# Patient Record
Sex: MALE | Race: BLACK OR AFRICAN AMERICAN | Employment: FULL TIME | URBAN - METROPOLITAN AREA
[De-identification: names, ages, dates, MRNs, and addresses within clinical notes are randomized per-mention and may not be internally consistent; named-entity substitution may affect disease eponyms.]

---

## 2021-12-13 ENCOUNTER — OFFICE VISIT (OUTPATIENT)
Dept: ORTHOPEDIC SURGERY | Age: 51
End: 2021-12-13
Payer: COMMERCIAL

## 2021-12-13 VITALS — TEMPERATURE: 97.5 F | WEIGHT: 229.4 LBS | BODY MASS INDEX: 31.99 KG/M2 | OXYGEN SATURATION: 98 % | HEART RATE: 70 BPM

## 2021-12-13 DIAGNOSIS — M20.021 CENTRAL SLIP EXTENSOR TENDON INJURY (BOUTONNIERE), RIGHT: Primary | ICD-10-CM

## 2021-12-13 PROCEDURE — 99203 OFFICE O/P NEW LOW 30 MIN: CPT | Performed by: ORTHOPAEDIC SURGERY

## 2021-12-13 PROCEDURE — 73140 X-RAY EXAM OF FINGER(S): CPT | Performed by: ORTHOPAEDIC SURGERY

## 2021-12-13 NOTE — PROGRESS NOTES
Karla Soto is a 46 y.o. male right handed . Worker's Compensation and legal considerations: none filed. Vitals:    12/13/21 0858   Pulse: 70   Temp: 97.5 °F (36.4 °C)   TempSrc: Temporal   SpO2: 98%   Weight: 229 lb 6.4 oz (104.1 kg)   PainSc:   0 - No pain           Chief Complaint   Patient presents with    Hand Pain     figner lac, middle finger, right          HPI: Patient presents today with a history of a right middle finger laceration 1 month prior. His finger is bent. Date of onset: November 2021    Injury: Yes: Comment: Laceration    Prior Treatment:  Yes: Comment: Urgent care antibiotics    Numbness/ Tingling: No      ROS: Review of Systems - General ROS: negative  Psychological ROS: negative  ENT ROS: negative  Allergy and Immunology ROS: negative  Hematological and Lymphatic ROS: negative  Respiratory ROS: no cough, shortness of breath, or wheezing  Cardiovascular ROS: no chest pain or dyspnea on exertion  Gastrointestinal ROS: no abdominal pain, change in bowel habits, or black or bloody stools  Musculoskeletal ROS: negative  Neurological ROS: negative  Dermatological ROS: negative    Past Medical History:   Diagnosis Date    History of gonorrhea     treated    HSV-1 infection     Hypertension     Prepatellar bursitis of right knee 2014       History reviewed. No pertinent surgical history. Current Outpatient Medications   Medication Sig Dispense Refill    hydroCHLOROthiazide (HYDRODIURIL) 25 mg tablet TAKE 1 TABLET BY MOUTH EVERY DAY         No Known Allergies        PE:     Physical Exam  Vitals and nursing note reviewed. Constitutional:       General: He is not in acute distress. Appearance: Normal appearance. He is not ill-appearing. Cardiovascular:      Pulses: Normal pulses. Pulmonary:      Effort: Pulmonary effort is normal. No respiratory distress. Musculoskeletal:         General: Swelling, tenderness, deformity and signs of injury present.       Cervical back: Normal range of motion and neck supple. Right lower leg: No edema. Left lower leg: No edema. Skin:     General: Skin is warm and dry. Capillary Refill: Capillary refill takes less than 2 seconds. Findings: Erythema and lesion present. No bruising. Neurological:      General: No focal deficit present. Mental Status: He is alert and oriented to person, place, and time. Psychiatric:         Mood and Affect: Mood normal.         Behavior: Behavior normal.            Right middle finger: There is resting boutonniere deformity noted with hyperextension of the DIP joint and flexion of the PIP joint that is passively correctable to within 10 degrees of terminal extension. This is not actively correctable. Neurovascularly intact distally. There is granulation tissue over the dorsal PIP joint of the right middle finger. Imagin2021 3 views of right middle finger does not show any fracture dislocation but finger is resting in a boutonniere deformity. ICD-10-CM ICD-9-CM    1. Central slip extensor tendon injury (boutonniere), right  M20.021 736.21 AMB POC XRAY, FINGER(S), 2+ VIEWS      REFERRAL TO OCCUPATIONAL THERAPY         Plan:     As patient needs something low-profile to be able to wear gloves and continue to work cutting meat, we will send him to occupational therapy for possibly a figure-of-eight splint. Additionally have instructed him to tape his fingers in a figure-of-eight fashion to keep his PIP joint straight. Follow-up and Dispositions    · Return in about 2 months (around 2022) for Reevaluation and OT follow-up.           Plan was reviewed with patient, who verbalized agreement and understanding of the plan

## 2021-12-16 ENCOUNTER — HOSPITAL ENCOUNTER (OUTPATIENT)
Dept: PHYSICAL THERAPY | Age: 51
Discharge: HOME | End: 2021-12-16
Attending: ORTHOPAEDIC SURGERY
Payer: OTHER GOVERNMENT

## 2021-12-16 PROCEDURE — 97760 ORTHOTIC MGMT&TRAING 1ST ENC: CPT

## 2021-12-16 PROCEDURE — 97535 SELF CARE MNGMENT TRAINING: CPT

## 2021-12-16 PROCEDURE — L3933 FO W/O JOINTS CF: HCPCS

## 2021-12-16 NOTE — PROGRESS NOTES
Hand Therapy Evaluation and Daily Note    Patient Name: Elida Proffer  Date:2021  : 1970  Age: 46 y.o.y/o  [x]  Patient  Verified  Payor: Praneeth Italo / Plan: Donny Deleon / Product Type: Commerical /    Referring Provider: DO Noah Rowley MD Visit:  2022  Onset Date:  2021  Surgical Date: na  Surgical Procedure: n/a    In time:11:20 AM  Out time:12:04 PM  Total Treatment Time (min): 44  Total Timed Codes (min): 10  1:1 Treatment Time (MC only): 44   Visit #: 1 of 6    Treatment Area: Finger pain, right [M79.644]    Precautions:    Hand Dominance: left handed   Hand Involved: right    Total Evaluation Time:  10    History of Present Condition:  Patient is a left hand dominant 46 y.o. male with a chief complaint  of right middle finger pain secondary to laceration of Novemeber 2021 when he cut his right middle finger on an edge blade when he was doing some repairs at home. Pt did not initially seek medical treatment until pain and infection took place in the finger. He completed a course of antibiotics and wound continues to heal.      Pain Rating:   Current: (0-no pain 10-debilitating pain) mild   At best: (0-no pain 10-debilitating pain) mild  At worst: (0-no pain 10-debilitating pain) moderate  Location: right finger  Type:  mild   Better with: hot and ice pack  Worse with: banging it    Medications/Allergies/Past Medical History:  See chart; reviewed with patient. HTN    Diagnostic Tests: Imagin/13/2021 3 views of right middle finger does not show any fracture dislocation but finger is resting in a boutonniere deformity.     Prior Level of Function: Independent with ADL/IADL tasks without functional limitations and pain using right hand    Current Level of Function:  Independent with functional limitations    Social History: pt is     Occupation/Job Requirements:     Observation: boutonierre deformity  Scar/incision:   na  Location:  Right middle finger Palpation:  Tenderness with palpation to PIP jt    Range of Motion:   Single Digit ROM CHART as measured in degrees  Digit  A/P 12/16/2021  Right long finger  extension Date  Side    MP      PIP 75     DIP      PINO        Strength:  NT      Sensation:    intact      Edema: Right long finger - P1 7.2 cm, PIP jt 8.0 cm, P2 6.9 cm   Left long finger PIP jt 6.8 cm    Special Tests: n/a    ADLs  Feeding:        []MaxA   []ModA   []Melony   [] CGA   []SBA   []Fanny   [x]Independent  UE Dressing:       []MaxA   []ModA   []Melony   [] CGA   []SBA   []Fanny   [x]Independent  LE Dressing:       []MaxA   []ModA   []Melony   [] CGA   []SBA   []Fanny   [x]Independent  Grooming:       []MaxA   []ModA   []Melony   [] CGA   []SBA   []Fanny   [x]Independent  Toileting:       []MaxA   []ModA   []Melony   [] CGA   []SBA   []Fanny   [x]Independent  Bathing:       []MaxA   []ModA   []Melony   [] CGA   []SBA   []Fanny   [x]Independent  Light Meal Prep:    []MaxA   []ModA   []Melony   [] CGA   []SBA   []Fanny   [x]Independent  Household/Other: []MaxA   []ModA   []Melony   [] CGA   []SBA   []Fanny   [x]Independent  Adaptive Equip:     []MaxA   []ModA   []Melony   [] CGA   []SBA   []Fanny   []Independent  Driving:       []MaxA   []ModA   []Melony   [] CGA   []SBA   []Fanny   [x]Independent      Todays Treatment:  Patient received an initial evaluation today followed by education as to diagnosis, precautions and treatment plan. Patient was provided with a basic home exercise program including DIP flexion PROM. Pt received custom fabricated circumferential orthosis to be worn daily on the right middle finger to keep the PIP extended. Pt was provided with written orthotic instructions and educated to wear continuously. He was able to properly melodie/doff the orthotic during this treatment session. He was also educated on wound care as wound is still healing.       OBJECTIVE  5 min Therapeutic Exercise:  [x] See flow sheet :   Rationale: increase ROM to improve the patients ability to passively flex DIP jt of right long finger    20 min Orthotic/Splinting: circumferential orthotic   Rationale: custom orthotic  to improve the patients ability to reduce PIP jt deformity. 9 min Self Care/Home Management: prognosis, diagnosis, precautions, treatment plan, splint wear and care, wound care   Rationale: education  to improve the patients ability to promote healing of injury site. With   [] TE   [] TA   [] neuro   [] other: Patient Education: [x] Review HEP    [] Progressed/Changed HEP based on:   [] positioning   [] body mechanics   [] transfers   [] heat/ice application   [] Splint wear/care   [] Sensory re-education   [] scar management      [] other:      Pain Level (0-10 scale) post treatment: 1/10    Patient will continue to benefit from skilled OT services to modify and progress therapeutic interventions, address ROM deficits, address strength deficits, analyze and address soft tissue restrictions, instruct in home and community integration and circumferential splinting to attain goals. Assessment: Pt presents to skilled OT today rating his pain level 1/10 in the right hand long finger. There is a boutonierre deformity of the digit with a wound healing on the dorsum PIP jt. There is mild tenderness with palpation to the PIP jt and edema noted in the finger. There is a 75 deg extension lag at the PIP jt.       Evaluation Complexity: History LOW Complexity : Brief history review  Examination LOW Complexity : 1-3 performance deficits relating to physical, cognitive , or psychosocial skils that result in activity limitations and / or participation restrictions  Clinical Decision Making LOW Complexity : No comorbidities that affect functional and no verbal or physical assistance needed to complete eval tasks   Overall Complexity Rating: LOW     Patient would benefit from OT/Hand therapy services for the following problems:  Problem List: Pain effecting function, Decreased range of motion, Decreased strength, Edema effecting function, Decreased coordination/prehension, Decreased ADL/functional abilities , Decreased activity tolerance, Decreased flexibility/joint mobility, Decreased transfer abilities and Sensability     Treatment Plan may include any combination of the following: Therapeutic exercise, Therapeutic activities, Neuromuscular re-education, Physical agent/modality, Scar management, Manual therapy, Splinting/orthoses, Wound care, Patient education and ADLs/IADLs    Patient / Family readiness to learn indicated by: asking questions, trying to perform skills and interest    Persons(s) to be included in education:   patient (P)    Barriers to Learning/Limitations: None    Patient Goal (s): to hopefully get a splint I can wear to work.     Patient Self Reported Health Status: good    Rehabilitation Potential: good    Short Term Goals: To be accomplished in 2  weeks:  Goal:* Patient will be compliant with initial home exercise program to take an active role in their rehabilitation process. Status at Eval: Patient was provided with a basic home exercise program including DIP flexion PROM    Goal:* Patient will demonstrate a good understanding of their condition and strategies for self-management. Status at Eval: pt educated on prognosis, diagnosis, precautions, treatment plan, splint wear and care, wound care    Goal: *Patient/Caregiver instructed in orthotic wear, care, and precautions   Status at [facility]: GOAL MET ON 12/16/2021     Goal: * Written orthotic instructions given. Status at [facility]: GOAL MET ON 12/16/2021     Goal: * Patient demonstrated independent donning and doffing of orthotic(s)   Status at [facility]: GOAL MET ON 12/16/2021     Long Term Goals: To be accomplished in 6 weeks:   Goal:* Pt will demonstrate improved digit extension to 10 deg or less in order to begin using right hand to reach into back pocket.   Status at [facility]: 75 deg extension lag right long finger    Goal:* Pt will improve FOTO outcome measure score by 10 points in order to demonstrate improved functional performance. Status at eval: 72    Goal:* Pt will be compliant with circumferential orthotic wear in order to take an active role in the rehabilitation process.    Status at eval: pt provided custom orthotic for right long finger    Frequency / Duration: Patient to be seen 1 times per week for 6 weeks:    Patient/ Caregiver education and instruction: Diagnosis, prognosis, self care, activity modification, brace/ splint application and exercises    Leopold Grimes, OT,  12/16/2021 11:22 AM

## 2021-12-16 NOTE — PROGRESS NOTES
[facility]  [address] [address]  [place], [address]  [phone] fax    Plan of Care/Statement of Necessity for Occupational Therapy Services    Patient name: Jarrell Smith Start of Care: 2021   Referral source: Richy Vanessa DO : 1970    Medical Diagnosis: Finger pain, right [F45.153]  Payor: Lola Williamson / Plan: Guerrero Polio / Product Type: Commerical /  Onset Date:2021    Treatment Diagnosis: right hand pain   Prior Hospitalization: see medical history Provider#: [id number]   Medications: Verified on Patient summary List    Comorbidities: HTN   Prior Level of Function:  Independent with ADL/IADL tasks without functional limitations and pain using right hand        The Plan of Care and following information is based on the information from the initial evaluation. Assessment/ key information: Patient is a left hand dominant 46 y.o. male with a chief complaint  of right middle finger pain secondary to laceration of Novemeber 2021 when he cut his right middle finger on an edge blade when he was doing some repairs at home. Pt did not initially seek medical treatment until pain and infection took place in the finger. He completed a course of antibiotics and wound continues to heal.   Pt presents to skilled OT today rating his pain level 1/10 in the right hand long finger. There is a boutonierre deformity of the digit with a wound healing on the dorsum PIP jt. There is mild tenderness with palpation to the PIP jt and edema noted in the finger. There is a 75 deg extension lag at the PIP jt. Patient received an initial evaluation today followed by education as to diagnosis, precautions and treatment plan. Patient was provided with a basic home exercise program including DIP flexion PROM. Pt received custom fabricated circumferential orthosis to be worn daily on the right middle finger to keep the PIP extended.  Pt was provided with written orthotic instructions and educated to wear continuously. He was able to properly melodie/doff the orthotic during this treatment session. He was also educated on wound care as wound is still healing. Skilled OT services are necessary to address aforementioned deficits and improve quality of life. Evaluation Complexity: History LOW Complexity : Brief history review  Examination LOW Complexity : 1-3 performance deficits relating to physical, cognitive , or psychosocial skils that result in activity limitations and / or participation restrictions  Clinical Decision Making LOW Complexity : No comorbidities that affect functional and no verbal or physical assistance needed to complete eval tasks   Overall Complexity Rating: LOW     Patient would benefit from OT/Hand therapy services for the following problems:  Problem List: Pain effecting function, Decreased range of motion, Decreased strength, Edema effecting function, Decreased coordination/prehension, Decreased ADL/functional abilities , Decreased activity tolerance, Decreased flexibility/joint mobility, Decreased transfer abilities and Sensability     Treatment Plan may include any combination of the following: Therapeutic exercise, Therapeutic activities, Neuromuscular re-education, Physical agent/modality, Scar management, Manual therapy, Splinting/orthoses, Wound care, Patient education and ADLs/IADLs    Patient / Family readiness to learn indicated by: asking questions, trying to perform skills and interest    Persons(s) to be included in education:   patient (P)    Barriers to Learning/Limitations: None    Patient Goal (s): to hopefully get a splint I can wear to work.     Patient Self Reported Health Status: good    Rehabilitation Potential: good    Short Term Goals: To be accomplished in 2  weeks:  Goal:* Patient will be compliant with initial home exercise program to take an active role in their rehabilitation process.   Status at Eval: Patient was provided with a basic home exercise program including DIP flexion PROM    Goal:* Patient will demonstrate a good understanding of their condition and strategies for self-management. Status at Eval: pt educated on prognosis, diagnosis, precautions, treatment plan, splint wear and care, wound care    Goal: *Patient/Caregiver instructed in orthotic wear, care, and precautions   Status at eval: GOAL MET ON 12/16/2021     Goal: * Written orthotic instructions given. Status at eval: GOAL MET ON 12/16/2021     Goal: * Patient demonstrated independent donning and doffing of orthotic(s)   Status at eval: GOAL MET ON 12/16/2021     Long Term Goals: To be accomplished in 6 weeks:   Goal:* Pt will demonstrate improved digit extension to 10 deg or less in order to begin using right hand to reach into back pocket. Status at eval: 75 deg extension lag right long finger    Goal:* Pt will improve FOTO outcome measure score by 10 points in order to demonstrate improved functional performance. Status at eval: 72    Goal:* Pt will be compliant with circumferential orthotic wear in order to take an active role in the rehabilitation process. Status at eval: pt provided custom orthotic for right long finger    Frequency / Duration: Patient to be seen 1 times per week for 6 weeks:    Patient/ Caregiver education and instruction: Diagnosis, prognosis, self care, activity modification, brace/ splint application and exercises    [x]  Plan of care has been reviewed with Denia Walker OT 12/16/2021 3:57 PM  ________________________________________________________________________    I certify that the above Therapy Services are being furnished while the patient is under my care. I agree with the treatment plan and certify that this therapy is necessary.     [facility] Signature:____________Date:_________TIME:________     Loli Copper, DO  ** Signature, Date and Time must be completed for valid certification **    Please sign and return to [address] [address] [facility]  [address], [facility] [address]  [phone] fax

## 2021-12-23 ENCOUNTER — HOSPITAL ENCOUNTER (OUTPATIENT)
Dept: PHYSICAL THERAPY | Age: 51
Discharge: HOME | End: 2021-12-23
Attending: ORTHOPAEDIC SURGERY
Payer: OTHER GOVERNMENT

## 2021-12-23 PROCEDURE — 97140 MANUAL THERAPY 1/> REGIONS: CPT

## 2021-12-23 PROCEDURE — 97763 ORTHC/PROSTC MGMT SBSQ ENC: CPT

## 2021-12-23 PROCEDURE — 97535 SELF CARE MNGMENT TRAINING: CPT

## 2021-12-23 NOTE — PROGRESS NOTES
OT DAILY TREATMENT NOTE     Patient Name: Michelle Murrieta  YOVT:  : 1970  [x]  Patient  Verified  Payor: Camila Beau / Plan: Pita Shortiff / Product Type: Commerical /    In time:9:50 AM  Out time:10:26 AM  Total Treatment Time (min): 36  Visit #: 2 of 6    Treatment Area: Finger pain, right [X52.395]    SUBJECTIVE  Pain Level (0-10 scale): 0/10  Any medication changes, allergies to medications, adverse drug reactions, diagnosis change, or new procedure performed?: [x] No    [] Yes (see summary sheet for update)  Subjective functional status/changes:   [] No changes reported  \"The splint worked out great, I can do everything at work. \"    \"The swelling has gone down. \"    OBJECTIVE    Modality rationale: increase tissue extensibility to improve the patients ability to extend right long finger   Min Type Additional Details    [] Estim:  []Unatt       []IFC  []Premod                        []Other:  []w/ice   []w/heat  Position:  Location:    [] Estim: []Att    []TENS instruct  []NMES                    []Other:  []w/US   []w/ice   []w/heat  Position:  Location:    []  Traction: [] Cervical       []Lumbar                       [] Prone          []Supine                       []Intermittent   []Continuous Lbs:  [] before manual  [] after manual    []  Ultrasound: []Continuous   [] Pulsed                           []1MHz   []3MHz W/cm2:  Location:    []  Iontophoresis with dexamethasone         Location: [] Take home patch   [] In clinic   5, concurrent with splint adjustments []  Ice     [x]  Heat - MHP  []  Ice massage  []  Laser   []  Paraffin Position:resting  Location: right hand    []  Laser with stim  []  Other:  Position:  Location:    []  Vasopneumatic Device    []  Right     []  Left  Pre-treatment girth:  Post-treatment girth:  Measured at (location):  Pressure:       [] lo [] med [] hi   Temperature: [] lo [] med [] hi       [x] Skin assessment post-treatment:  [x]intact [x]redness- no adverse reaction    []redness - adverse reaction:     5 min Therapeutic Exercise:  [x] See flow sheet :   Rationale: increase ROM to improve the patients ability to flex DIP jt    10 min Manual Therapy:  IASTM using tool #6 using sweeping technique    The manual therapy interventions were performed at a separate and distinct time from the therapeutic activities interventions. Rationale: decrease edema  to right long finger    10 min Orthotic/Splinting: refitted for improved wear   Rationale: custom orthotic  to improve the patients ability to extend right long finger    11 min Self Care/Home Management: LMB splint wear to improve extension, splint wear and care   Rationale: education  to improve the patients ability to promote healing to right long finger. With   [] TE   [] TA   [] neuro   [] other: Patient Education: [x] Review HEP    [] Progressed/Changed HEP based on:   [] positioning   [] body mechanics   [] transfers   [] heat/ice application   [] Splint wear/care   [] Sensory re-education   [] scar management      [] other:            Other Objective/Functional Measures:     Range of Motion:   Single Digit ROM CHART as measured in degrees  Digit  A/P 12/16/2021  Right long finger  extension 12/23/2021   Finger extension     MP         PIP 75  68     DIP         PINO            12/23/2021 Edema: Right long finger - P1 7.0 cm, PIP jt 7.5 cm, P2 6.4 cm     Pain Level (0-10 scale) post treatment: 0/10    ASSESSMENT/Changes in Function: Compliant with circumferential splint wear. Improving appearance of laceration wound with good healing noted. Skin intact. Adjusted custom orthotic for improved wear. Reduced edema and improved PIP extension noted upon arrival.  Provided LMB splint for wear if circumferential brace becomes too loose prior to next appointment. All questions addressed during this treatment session and circumferential splint appears to be fitting well.      Patient will continue to benefit from skilled OT services to modify and progress therapeutic interventions, address ROM deficits, address strength deficits, analyze and address soft tissue restrictions and instruct in home and community integration to attain remaining goals. []  See Plan of Care  []  See progress note/recertification  []  See Discharge Summary         Progress towards goals / Updated goals:  Short Term Goals: To be accomplished in 2  weeks:  Goal:* Patient will be compliant with initial home exercise program to take an active role in their rehabilitation process. Status at [facility]: Patient was provided with a basic home exercise program including DIP flexion PROM     Goal:* Patient will demonstrate a good understanding of their condition and strategies for self-management. Status at [facility]: pt educated on prognosis, diagnosis, precautions, treatment plan, splint wear and care, wound care     Goal: *Patient/Caregiver instructed in orthotic wear, care, and precautions   Status at [facility]: GOAL MET ON 12/16/2021      Goal: * Written orthotic instructions given. Status at [facility]: GOAL MET ON 12/16/2021      Goal: * Patient demonstrated independent donning and doffing of orthotic(s)   Status at [facility]: GOAL MET ON 12/16/2021      Long Term Goals: To be accomplished in 6 weeks:          Goal:* Pt will demonstrate improved digit extension to 10 deg or less in order to begin using right hand to reach into back pocket. Status at [facility]: 75 deg extension lag right long finger  12/23/2021 - 68 deg extension lag     Goal:* Pt will improve FOTO outcome measure score by 10 points in order to demonstrate improved functional performance. Status at [facility]: 72     Goal:* Pt will be compliant with circumferential orthotic wear in order to take an active role in the rehabilitation process.    Status at [facility]: pt provided custom orthotic for right long finger   12/23/2021 - good compliance until this date    PLAN  []  Upgrade activities as tolerated     [x]  Continue plan of care  []  Update interventions per flow sheet       []  Discharge due to:_  []  Other:_      Leopold Grimes, OT 12/23/2021  9:52 AM    Future Appointments   Date Time Provider Steven Bennett   2/14/2022  8:15 AM Morro CHAVEZ DO [facility]   8/18/2022 10:00 AM Mayra Rios

## 2022-01-03 ENCOUNTER — APPOINTMENT (OUTPATIENT)
Dept: PHYSICAL THERAPY | Age: 52
End: 2022-01-03
Attending: ORTHOPAEDIC SURGERY
Payer: OTHER GOVERNMENT

## 2022-01-04 ENCOUNTER — HOSPITAL ENCOUNTER (OUTPATIENT)
Dept: PHYSICAL THERAPY | Age: 52
Discharge: HOME | End: 2022-01-04
Attending: ORTHOPAEDIC SURGERY
Payer: OTHER GOVERNMENT

## 2022-01-04 PROCEDURE — 97140 MANUAL THERAPY 1/> REGIONS: CPT

## 2022-01-04 PROCEDURE — 97110 THERAPEUTIC EXERCISES: CPT

## 2022-01-04 PROCEDURE — 97022 WHIRLPOOL THERAPY: CPT

## 2022-01-04 NOTE — PROGRESS NOTES
OT DAILY TREATMENT NOTE - [facility]     Patient Name: Georgia Brasher  Date:2022  : 1970  [x]  Patient  Verified  Payor: Lynnette Height / Plan: [facility] / Product Type: Commerical /    In time: 9:30  Out time: 10:13  Total Treatment Time (min): 43   Visit #: 3 of 6         Medicare/BCBS Only   Total Timed Codes (min):  33 1:1 Treatment Time:  43      Treatment Area: Finger pain, right [M79.644]    SUBJECTIVE    Pain Level (0-10 scale): 0/10  Any medication changes, allergies to medications, adverse drug reactions, diagnosis change, or new procedure performed?: [x] No    [] Yes (see summary sheet for update)  Subjective functional status/changes:   [] No changes reported    \"My finger is starting to swell more\"  \"I have been wearing my splint\"  \"using episome salt to help hand\"     OBJECTIVE    Modality rationale: decrease edema, decrease inflammation, decrease pain and increase tissue extensibility to improve the patients ability to  and pinch.     Min Type Additional Details    [] Estim:  []Unatt       []IFC  []Premod                        []Other:  []w/ice   []w/heat  Position:  Location:    [] Estim: []Att    []TENS instruct  []NMES                    []Other:  []w/US   []w/ice   []w/heat  Position:  Location:    []  Traction: [] Cervical       []Lumbar                       [] Prone          []Supine                       []Intermittent   []Continuous Lbs:  [] before manual  [] after manual    []  Ultrasound: []Continuous   [] Pulsed                           []1MHz   []3MHz W/cm2:  Location:    []  Iontophoresis with dexamethasone         Location: [] Take home patch   [] In clinic   10 []  Ice     [x]  Heat- fluidotherapy  []  Ice massage  []  Laser   []  Anodyne Position: seated/resting  Location: right hand     []  Laser with stim  []  Other:  Position:  Location:    []  Vasopneumatic Device Pressure:       [] lo [] med [] hi   Temperature: [] lo [] med [] hi   [] Skin assessment post-treatment: []intact []redness- no adverse reaction    []redness - adverse reaction:     21 min Therapeutic Exercise:  [] See flow sheet :   Rationale: increase ROM and increase strength to improve the patients ability to  and pinch. Right hand:    MF DIP PROM   MF hyperextension   Blocking - DIP and PIP  Tendon glides   Towel scrunches x8      12 min Manual Therapy:  IASTM #6   Rationale: decrease pain, increase ROM, increase tissue extensibility and decrease edema  to /grasp. Edema massage with IASTM #6 on right MF. With   [] TE   [] TA   [] neuro   [] other: Patient Education: [x] Review HEP    [x] Progressed/Changed HEP based on:  Min cues for pacing with PROM exercises. [] positioning   [] body mechanics   [] transfers   [] heat/ice application   [] Splint wear/care   [] Sensory re-education   [] scar management      [] other:            Other Objective/Functional Measures:     Performed all digit PROM without pain   Performed all MF digit ROM without pain. 12/23/2021 Edema: Right long finger - PIP jt 7.5 cm.   1/4/2022: Right long finger PIP jt 7.7cm. Pain Level (0-10 scale) post treatment: 0/10    ASSESSMENT/Changes in Function: slight increase in edema in MF PIP jt, compliant with orthotic wear schedule, no increase in pain levels with constant PIP and DIP jt ROM. Patient will continue to benefit from skilled OT services to address ROM deficits, address strength deficits, analyze and address soft tissue restrictions and analyze and modify body mechanics/ergonomics to attain remaining goals. [x]  See Plan of Care  []  See progress note/recertification  []  See Discharge Summary         Progress towards goals / Updated goals:    Short Term Goals: To be accomplished in 2  weeks:  Goal:* Patient will be compliant with initial home exercise program to take an active role in their rehabilitation process.   Status at al: Patient was provided with a basic home exercise program including DIP flexion PROM  1/4/22: reviewed PROM , min cues for pacing. Initiated blocking exercises.      Goal:* Patient will demonstrate a good understanding of their condition and strategies for self-management. Status at Eval: pt educated on prognosis, diagnosis, precautions, treatment plan, splint wear and care, wound care     Goal: *Patient/Caregiver instructed in orthotic wear, care, and precautions   Status at eval: GOAL MET ON 12/16/2021      Goal: * Written orthotic instructions given. Status at eval: GOAL MET ON 12/16/2021      Goal: * Patient demonstrated independent donning and doffing of orthotic(s)   Status at eval: GOAL MET ON 12/16/2021      Long Term Goals: To be accomplished in 6 weeks:          Goal:* Pt will demonstrate improved digit extension to 10 deg or less in order to begin using right hand to reach into back pocket. Status at eval: 75 deg extension lag right long finger  12/23/2021 - 68 deg extension lag     Goal:* Pt will improve FOTO outcome measure score by 10 points in order to demonstrate improved functional performance. Status at eval: 72     Goal:* Pt will be compliant with circumferential orthotic wear in order to take an active role in the rehabilitation process.    Status at eval: pt provided custom orthotic for right long finger   12/23/2021 - good compliance until this date    PLAN  []  Upgrade activities as tolerated     [x]  Continue plan of care  []  Update interventions per flow sheet       []  Discharge due to:_  []  Other:_      AROLDO Clancy 1/4/2022  9:29 AM    Future Appointments   Date Time Provider Steven Bennett   1/4/2022  9:30 [facility]   1/13/2022  9:45 AM [facility]   2/14/2022  8:15 AM James CHAVEZ DO [facility]   8/18/2022 10:00 AM Mayra Bazan

## 2022-01-19 ENCOUNTER — HOSPITAL ENCOUNTER (OUTPATIENT)
Dept: PHYSICAL THERAPY | Age: 52
End: 2022-01-19
Attending: ORTHOPAEDIC SURGERY
Payer: OTHER GOVERNMENT

## 2022-01-25 ENCOUNTER — HOSPITAL ENCOUNTER (OUTPATIENT)
Dept: PHYSICAL THERAPY | Age: 52
Discharge: HOME | End: 2022-01-25
Attending: ORTHOPAEDIC SURGERY
Payer: OTHER GOVERNMENT

## 2022-01-25 PROCEDURE — 97763 ORTHC/PROSTC MGMT SBSQ ENC: CPT

## 2022-01-25 PROCEDURE — 97535 SELF CARE MNGMENT TRAINING: CPT

## 2022-01-25 PROCEDURE — 97140 MANUAL THERAPY 1/> REGIONS: CPT

## 2022-01-25 NOTE — PROGRESS NOTES
[facility]  [address]  [place], [address]  [phone] fax    Occupational Therapy Progress Note  Patient name: Richardson Regalado Start of Care: 2021   Referral source: Zakia Ferrell DO : 1970   Medical/Treatment Diagnosis: Finger pain, right [W40.152]  Payor: Ana Luisa Lucas / Plan: Jose G Leroy / Product Type: Commerical /  Onset Date:2021     Prior Hospitalization: see medical history Provider#: [id number]   Medications: Verified on Patient Summary List     Comorbidities: HTN   Prior Level of Function:  Independent with ADL/IADL tasks without functional limitations and pain using right hand   Visits from Start of Care: 4    Missed Visits: 2    Established Goals:   Short Term Goals: To be accomplished in 2  weeks:  Goal:* Patient will be compliant with initial home exercise program to take an active role in their rehabilitation process. Status at Eval: Patient was provided with a basic home exercise program including DIP flexion PROM  22: reviewed PROM , min cues for pacing. Initiated blocking exercises.    Status at [facility] 2022 - good DIP flexion noted, goal met    Goal:* Patient will demonstrate a good understanding of their condition and strategies for self-management. Status at [facility]: pt educated on prognosis, diagnosis, precautions, treatment plan, splint wear and care, wound care  Status at [facility] 2022 - noncompliant with splint wear due to losing it, refitted for splint, pt verbalized understanding of wear. Goal met     Goal: *Patient/Caregiver instructed in orthotic wear, care, and precautions   Status at eval: GOAL MET ON 2021      Goal: * Written orthotic instructions given.   Status at [facility]: GOAL MET ON 2021      Goal: * Patient demonstrated independent donning and doffing of orthotic(s)   Status at [facility]: GOAL MET ON 2021      Long Term Goals: To be accomplished in 6 weeks:          Goal:* Pt will demonstrate improved digit extension to 10 deg or less in order to begin using right hand to reach into back pocket. Status at eval: 75 deg extension lag right long finger  12/23/2021 - 68 deg extension lag  Status at [facility] 1/25/2022 - 80 deg extension lag, regressed     Goal:* Pt will improve FOTO outcome measure score by 10 points in order to demonstrate improved functional performance. Status at eval: 72  Status at PN 1/25/2022- NT     Goal:* Pt will be compliant with circumferential orthotic wear in order to take an active role in the rehabilitation process. Status at eval: pt provided custom orthotic for right long finger   12/23/2021 - good compliance until this date  Status at [facility] 1/25/2022 - lost splint, fabricated circumferential orthotic during this treatment session for continual wear. Key Functional Changes: increased edema and worsening of extension lag due to losing splint    Updated Goals: to be achieved in 6 weeks:  Goal:* Pt will demonstrate improved digit extension to 10 deg or less in order to begin using right hand to reach into back pocket. Status at eval: 75 deg extension lag right long finger  12/23/2021 - 68 deg extension lag  Status at [facility] 1/25/2022 - 80 deg extension lag, regressed     Goal:* Pt will improve FOTO outcome measure score by 10 points in order to demonstrate improved functional performance. Status at eval: 72  Status at PN 1/25/2022- NT     Goal:* Pt will be compliant with circumferential orthotic wear in order to take an active role in the rehabilitation process. Status at eval: pt provided custom orthotic for right long finger   12/23/2021 - good compliance until this date  Status at PN 1/25/2022 - lost splint, fabricated circumferential orthotic during this treatment session for continual wear. ASSESSMENT/RECOMMENDATIONS: Reduced edema with MHP and IASTM. Pt was fitted for circumferential orthotic to be worn continuously for the next 6 weeks.   Pt to return to skilled OT for orthotic adjustments and AROM once full extension is obtained. [x]Continue therapy per initial plan/protocol at a frequency of  1 x per week for 6 weeks  [x]Continue therapy with the following recommended changes:__continuous orthotic wear  []Discontinue therapy progressing towards or have reached established goals  []Discontinue therapy due to lack of appreciable progress towards goals  []Discontinue therapy due to lack of attendance or compliance  []Await Physician's recommendations/decisions regarding therapy  []Other:________________________________________________________________    Thank you for this referral.   Sara Vang OT 1/25/2022 6:18 PM  NOTE TO PHYSICIAN:  [address]   FAX TO [facility]: [phone]  If you are unable to process this request in 24 hours please contact our office: [phone]    ? I have read the above report and request that my patient continue as recommended. ? I have read the above report and request that my patient continue therapy with the following changes/special instructions:__________________________________________________________  ? I have read the above report and request that my patient be discharged from therapy.     [de-identified] Signature:____________Date:_________TIME:________     Linn Riddle DO  ** Signature, Date and Time must be completed for valid certification **

## 2022-01-25 NOTE — PROGRESS NOTES
OT DAILY TREATMENT NOTE     Patient Name: Harriet Schneider  Date:2022  : 1970  [x]  Patient  Verified  Payor: Rashid Blood / Plan: Ian Garcia / Product Type: Commerical /    In time:5:08 PM  Out time:5:56 PM  Total Treatment Time (min): 48  Visit #: 4 of 6    Medicare/BCBS Only   Total Timed Codes (min):  48 1:1 Treatment Time:  48     Treatment Area: Finger pain, right [M79.644]    SUBJECTIVE  Pain Level (0-10 scale): 0/10  Any medication changes, allergies to medications, adverse drug reactions, diagnosis change, or new procedure performed?: [x] No    [] Yes (see summary sheet for update)  Subjective functional status/changes:   [] No changes reported  \"It's getting worse and I haven't been able to get in here. \"    OBJECTIVE    Modality rationale: decrease pain and increase tissue extensibility to improve the patients ability to extend right long finger   Min Type Additional Details    [] Estim:  []Unatt       []IFC  []Premod                        []Other:  []w/ice   []w/heat  Position:  Location:    [] Estim: []Att    []TENS instruct  []NMES                    []Other:  []w/US   []w/ice   []w/heat  Position:  Location:    []  Traction: [] Cervical       []Lumbar                       [] Prone          []Supine                       []Intermittent   []Continuous Lbs:  [] before manual  [] after manual    []  Ultrasound: []Continuous   [] Pulsed                           []1MHz   []3MHz W/cm2:  Location:    []  Iontophoresis with dexamethasone         Location: [] Take home patch   [] In clinic   5, concurrent with self care []  Ice     [x]  Heat - MHP  []  Ice massage  []  Laser   []  Paraffin Position: resting  Location: right hand    []  Laser with stim  []  Other:  Position:  Location:    []  Vasopneumatic Device    []  Right     []  Left  Pre-treatment girth:  Post-treatment girth:  Measured at (location):  Pressure:       [] lo [] med [] hi   Temperature: [] lo [] med [] hi       [x] Skin assessment post-treatment:  [x]intact [x]redness- no adverse reaction    []redness - adverse reaction:     10 min Manual Therapy:  IASTM using tool #6 using sweeping technique    The manual therapy interventions were performed at a separate and distinct time from the therapeutic activities interventions. Rationale: increase tissue extensibility and decrease edema  to right long finger    15 min Orthotic/Splinting: circumferential orthotic   Rationale: circumferential orthotic  to improve the patients ability to reduce edema and extend right long finger. 23 min Self Care/Home Management: prognosis, splint wear, treatment plan. Rationale: education  to improve the patients ability to promote healing of right long finger      With   [] TE   [] TA   [] neuro   [] other: Patient Education: [x] Review HEP    [] Progressed/Changed HEP based on:   [] positioning   [] body mechanics   [] transfers   [] heat/ice application   [] Splint wear/care   [] Sensory re-education   [] scar management      [] other:            Other Objective/Functional Measures:   Range of Motion:   Single Digit ROM CHART as measured in degrees  Digit  A/P 12/16/2021  Right long finger  extension 12/23/2021   Finger extension  1/25/2022   Right long finger PINO   MP      76   PIP 75  68     DIP      50   PINO            12/23/2021 Edema: Right long finger - P1 7.0 cm, PIP jt 7.5 cm, P2 6.4 cm     1/25/2022 - Right long finger P1 6.8 cm, PIP jt 7.6 cm, P2 6.2 cm  After IASTM P1 6.3 cm, PIP jt 7.2 cm, P2 6.0 cm              Pain Level (0-10 scale) post treatment: 0/10    ASSESSMENT/Changes in Function: Pt has not been seen for skilled OT since 1/4/22. Pt reports he lost his circumferential splint after 12/23 therefore he has been only wearing the LMB extension splint intermittently. Worsening extension lag at the right long finger PIP jt and increased edema. Reduced edema with MHP and IASTM.   Pt was fitted for circumferential orthotic to be worn continuously for the next 6 weeks. Pt to return to skilled OT for orthotic adjustments and AROM once full extension is obtained. Patient will continue to benefit from skilled OT services to modify and progress therapeutic interventions, address ROM deficits and analyze and address soft tissue restrictions to attain remaining goals. []  See Plan of Care  [x]  See progress note/recertification  []  See Discharge Summary         Progress towards goals / Updated goals:  Short Term Goals: To be accomplished in 2  weeks:  Goal:* Patient will be compliant with initial home exercise program to take an active role in their rehabilitation process. Status at Eval: Patient was provided with a basic home exercise program including DIP flexion PROM  1/4/22: reviewed PROM , min cues for pacing. Initiated blocking exercises.    Status at PN 1/25/2022 - good DIP flexion noted, goal met    Goal:* Patient will demonstrate a good understanding of their condition and strategies for self-management. Status at Eval: pt educated on prognosis, diagnosis, precautions, treatment plan, splint wear and care, wound care  Status at PN 1/25/2022 - noncompliant with splint wear due to losing it, refitted for splint, pt verbalized understanding of wear. Goal met     Goal: *Patient/Caregiver instructed in orthotic wear, care, and precautions   Status at [facility]: GOAL MET ON 12/16/2021      Goal: * Written orthotic instructions given. Status at [facility]: GOAL MET ON 12/16/2021      Goal: * Patient demonstrated independent donning and doffing of orthotic(s)   Status at [facility]: GOAL MET ON 12/16/2021      Long Term Goals: To be accomplished in 6 weeks:          Goal:* Pt will demonstrate improved digit extension to 10 deg or less in order to begin using right hand to reach into back pocket.   Status at eval: 75 deg extension lag right long finger  12/23/2021 - 68 deg extension lag  Status at PN 1/25/2022 - 80 deg extension lag, regressed     Goal:* Pt will improve FOTO outcome measure score by 10 points in order to demonstrate improved functional performance. Status at eval: 72  Status at PN 1/25/2022- NT     Goal:* Pt will be compliant with circumferential orthotic wear in order to take an active role in the rehabilitation process. Status at eval: pt provided custom orthotic for right long finger   12/23/2021 - good compliance until this date  Status at PN 1/25/2022 - lost splint, fabricated circumferential orthotic during this treatment session for continual wear.      PLAN  []  Upgrade activities as tolerated     [x]  Continue plan of care  []  Update interventions per flow sheet       []  Discharge due to:_  []  Other:_      Eamon Denson OT 1/25/2022  5:03 PM    Future Appointments   Date Time Provider Steven Bennett   1/25/2022  5:15 PM Miguel Morris OT [id number]   2/14/2022  8:15 AM DO EMILIO Cook BS AMB   8/18/2022 10:00 AM Mayra Regan

## 2022-02-22 ENCOUNTER — TELEPHONE (OUTPATIENT)
Dept: PHYSICAL THERAPY | Age: 52
End: 2022-02-22

## 2022-03-22 NOTE — PROGRESS NOTES
[facility]  [address]  [place], [address]  [phone] fax    Occupational Therapy Discharge Summary    Patient name: Esequiel Finch Start of Care: 2021   Referral source: Esteban Coronado DO : 1970   Medical/Treatment Diagnosis: Finger pain, right [L95.017]  Payor: Alejo Mendieta / Plan: Huy Arango / Product Type: Commerical /  Onset Date:2021     Prior Hospitalization: see medical history Provider#: [id number]   Medications: Verified on Patient Summary List    Comorbidities: HTN  Prior Level of Function: Independent with ADL/IADL tasks without functional limitations and pain using right hand   Visits from Start of Care: 4    Missed Visits: 1  Reporting Period : 2021 to 2022    Summary of Care: Pt briefly participated in Therapeutic exercise, Physical agent/modality,Manual therapy, Splinting/orthoses,Patient education and ADLs/IADLs. Goal:* Pt will demonstrate improved digit extension to 10 deg or less in order to begin using right hand to reach into back pocket. Status at last note/certification:2022 - 80 deg extension lag, regressed  Status at discharge: NT, pt did not schedule future appointments.     Goal:* Pt will improve FOTO outcome measure score by 10 points in order to demonstrate improved functional performance. Status at eval: 72  Status at last note/certification: 2022- NT  Status at discharge: NT, pt did not schedule future appointments.     Goal:* Pt will be compliant with circumferential orthotic wear in order to take an active role in the rehabilitation process. Status at last note/certification: 0/15/9890 - lost splint, fabricated circumferential orthotic during this treatment session for continual wear. Status at discharge: unable to report on goal,  pt did not schedule future appointments.     ASSESSMENT/RECOMMENDATIONS: Pt has received education on various self-management strategies, Pt has a circumferential orthotic and was instructed to wear orthotic for 6 weeks.  Pt was instructed to return to skilled OT for orthotic adjustments. Pt was contacted about setting up future appointments, however Pt has not scheduled any future appointments at this time.      [x]Discontinue therapy: []Patient has reached or is progressing toward set goals      [x]Patient is non-compliant or has abdicated      []Due to lack of appreciable progress towards set goals    AROLDO Titus 3/22/2022 1:23 PM

## 2025-06-26 ENCOUNTER — TRANSCRIBE ORDERS (OUTPATIENT)
Facility: HOSPITAL | Age: 55
End: 2025-06-26

## 2025-06-26 DIAGNOSIS — Z00.00 ROUTINE GENERAL MEDICAL EXAMINATION AT A HEALTH CARE FACILITY: Primary | ICD-10-CM

## 2025-07-17 ENCOUNTER — HOSPITAL ENCOUNTER (OUTPATIENT)
Age: 55
Discharge: HOME | End: 2025-07-20

## 2025-07-17 DIAGNOSIS — Z00.00 ROUTINE GENERAL MEDICAL EXAMINATION AT A HEALTH CARE FACILITY: ICD-10-CM

## 2025-07-17 PROCEDURE — 75571 CT HRT W/O DYE W/CA TEST: CPT

## 2025-08-25 ENCOUNTER — HOSPITAL ENCOUNTER (OUTPATIENT)
Facility: HOSPITAL | Age: 55
Setting detail: SPECIMEN
Discharge: HOME | End: 2025-08-28

## 2025-08-25 LAB — LABCORP SPECIMEN COLLECTION: NORMAL

## 2025-08-25 PROCEDURE — 99001 SPECIMEN HANDLING PT-LAB: CPT
